# Patient Record
Sex: FEMALE | Race: WHITE | NOT HISPANIC OR LATINO | Employment: OTHER | URBAN - METROPOLITAN AREA
[De-identification: names, ages, dates, MRNs, and addresses within clinical notes are randomized per-mention and may not be internally consistent; named-entity substitution may affect disease eponyms.]

---

## 2023-06-01 DIAGNOSIS — D50.9 IRON DEFICIENCY ANEMIA, UNSPECIFIED IRON DEFICIENCY ANEMIA TYPE: Primary | ICD-10-CM

## 2023-06-01 NOTE — PROGRESS NOTES
.     REASON FOR CONSULTATION:   Iron deficiency anemia  Provide an opinion on any further workup or treatment                             REQUESTING PHYSICIAN: MARCUS Leung  RECORDS OBTAINED:  Records of the patient's history including those obtained from the referring provider were reviewed and summarized in detail.    HISTORY OF PRESENT ILLNESS:  The patient is a 73 y.o. year old female  who is here for follow-up with the above-mentioned history.    Reviewed PCP note, 5/18/2023 which was an annual wellness visit  5/18/2023: Hb 7.6.  Unable to take oral iron due to constipation.  Referred to us to consider IV iron.  Last colonoscopy and EGD by Dr. Petty, 2021.  WBC 2.7 on 5/18/2023, from 3.1 on 2/17/2023, from 3.3 on 11/28/2022.  Hb 7.6 on 5/18/2023, from 8.5 on 2/17/2023, from 8.6 on 11/28/2022.  MCV 78.4 on 5/18/2023, from 79.6 on 2/17/2023, from 86.4 on 11/28/2022.   on 5/18/2023.    11/28/2022: Ferritin 3, 12% saturation.    Denies bleeding.  Denies ice cravings.  Denies chest pain or SOA.  States she has been a little unsteady on her feet for the past year or so.  Does not think this has been worse over the past couple of months.  States she does not feel she is at risk for falling.  States energy level is good    Past Medical History:   Diagnosis Date   • Arthritis    • Cubital tunnel syndrome    • Diabetes mellitus    • H/O Skin lesion    • History of atrial fibrillation    • History of Brennan's esophagus    • History of iron deficiency anemia    • Hypercholesterolemia    • Hyperlipidemia      Past Surgical History:   Procedure Laterality Date   • HAND SURGERY Right    • TOTAL KNEE ARTHROPLASTY Left    • WISDOM TOOTH EXTRACTION         MEDICATIONS    Current Outpatient Medications:   •  AMLODIPINE BENZOATE PO, amLODIPine Benzoate, Disp: , Rfl:   •  apixaban (ELIQUIS) 5 MG tablet tablet, Take 1 tablet by mouth., Disp: , Rfl:   •  cyanocobalamin (VITAMIN B-12) 500 MCG tablet, Take 1  tablet by mouth Daily., Disp: , Rfl:   •  dapagliflozin (FARXIGA) 5 MG tablet tablet, Take 1 tablet by mouth Daily., Disp: 30 tablet, Rfl: 11  •  ferrous gluconate (FERGON) 324 MG tablet, Take 1 tablet by mouth Daily., Disp: , Rfl:   •  gabapentin (NEURONTIN) 300 MG capsule, , Disp: , Rfl:   •  metFORMIN ER (GLUCOPHAGE-XR) 500 MG 24 hr tablet, Take 1 tablet by mouth., Disp: , Rfl:   •  metoprolol succinate XL (TOPROL-XL) 50 MG 24 hr tablet, , Disp: , Rfl:   •  pravastatin (PRAVACHOL) 20 MG tablet, , Disp: , Rfl:   •  PRAVASTATIN SODIUM PO, Pravastatin Sodium, Disp: , Rfl:     ALLERGIES:     Allergies   Allergen Reactions   • Ace Inhibitors Unknown - Low Severity     And ARB   • Ampicillin Unknown - Low Severity   • Methylprednisolone Hives   • Penicillins Hives   • Rosuvastatin Unknown - Low Severity       SOCIAL HISTORY:       Social History     Socioeconomic History   • Marital status:    Tobacco Use   • Smoking status: Never   • Smokeless tobacco: Never   Substance and Sexual Activity   • Alcohol use: Never         FAMILY HISTORY:  Family History   Problem Relation Age of Onset   • Melanoma Mother    • Heart attack Father    • Cancer Brother    • Cancer Maternal Aunt        REVIEW OF SYSTEMS:  Review of Systems   Constitutional: Negative for activity change.   HENT: Negative for nosebleeds and trouble swallowing.    Respiratory: Negative for shortness of breath and wheezing.    Cardiovascular: Negative for chest pain and palpitations.   Gastrointestinal: Negative for constipation, diarrhea and nausea.   Genitourinary: Negative for dysuria and hematuria.   Musculoskeletal: Negative for arthralgias and myalgias.   Skin: Negative for rash and wound.   Neurological: Negative for seizures and syncope.   Hematological: Negative for adenopathy. Does not bruise/bleed easily.   Psychiatric/Behavioral: Negative for confusion.              Vitals:    06/02/23 1006   BP: 128/75   Pulse: 68   Resp: 16   Temp: 97.8 °F  "(36.6 °C)   TempSrc: Temporal   Weight: 70 kg (154 lb 6.4 oz)   Height: 154.9 cm (61\")   PainSc: 0-No pain         6/2/2023    10:08 AM   Current Status   ECOG score 0      PHYSICAL EXAM:          CONSTITUTIONAL:  Vital signs reviewed.  No distress, looks comfortable.  EYES:  Conjunctiva and lids unremarkable.  PERRLA  EARS,NOSE,MOUTH,THROAT:  Ears and nose appear unremarkable.  Lips, teeth, gums appear unremarkable.  RESPIRATORY:  Normal respiratory effort.  Lungs clear to auscultation bilaterally.  CARDIOVASCULAR:  Normal S1, S2.  No murmurs rubs or gallops.  No significant lower extremity edema.  GASTROINTESTINAL: Abdomen appears unremarkable.  Nontender.  No hepatomegaly.  No splenomegaly.  LYMPHATIC:  No cervical, supraclavicular, axillary lymphadenopathy.  SKIN:  Warm.  No rashes.  PSYCHIATRIC:  Normal judgment and insight.  Normal mood and affect.          RECENT LABS:        WBC   Date Value Ref Range Status   06/02/2023 3.61 3.40 - 10.80 10*3/mm3 Final     Hemoglobin   Date Value Ref Range Status   06/02/2023 6.7 (C) 12.0 - 15.9 g/dL Final     Platelets   Date Value Ref Range Status   06/02/2023 239 140 - 450 10*3/mm3 Final       Assessment & Plan   Iron deficiency anemia, unspecified iron deficiency anemia type  - Hold Transfusion and Notify Physician  - Nursing communication: Transfusion Reaction Management  - Verify Informed Consent for Transfusion  - Prepare RBC, 2 Units  - Transfuse RBC, 2 Units Infuse Each Unit Over: 2H  - sodium chloride 0.9 % infusion 250 mL  - Ambulatory Referral to [facility] For Infusion Treatment  - diphenhydrAMINE (BENADRYL) capsule 25 mg  - acetaminophen (TYLENOL) tablet 650 mg  - Type and screen  - Ferritin  - Iron Profile  - Retic With IRF & RET-He  - Vitamin B12  - Folate        Radha S Elliot   *Iron deficiency anemia  · 11/28/2022: Ferritin 3, 12% saturation.  · Cannot tolerate oral iron due to constipation  · Hb 6.7 on 6/2/2023 (initial consult), from 7.6 on 5/18/2023, from 8.5 " on 2/17/2023, from 8.6 on 11/28/2022.  · 6/2/2023: Transfuse 2 units and arrange 2 doses Injectafer (provided pending iron labs are low)    *Microcytosis  · MCV 82.3, from 78.4 on 5/18/2023, from 79.6 on 2/17/2023, from 86.4 on 11/28/2022.    *Source of iron deficiency  · Last colonoscopy and EGD by Dr. Petty, 2021.  · 6/2/2023 (initial consult): She declines referral for small bowel evaluation.  She understands she may have a GI malignancy that is bleeding causing iron deficiency but declines any further evaluation for this    *Leukocytopenia  · WBC 2.7 on 5/18/2023, from 3.1 on 2/17/2023, from 3.3 on 11/28/2022.    Plan  · Check iron labs.  If remains low, arrange IV iron  · Check B12 and RBC folate due to low WBC  · CBC on day of second Injectafer  · MD roughly 2 months.  At least 1 day prior: Ferritin, iron panel, CBC, reticulate hemoglobin

## 2023-06-02 ENCOUNTER — CONSULT (OUTPATIENT)
Dept: ONCOLOGY | Facility: CLINIC | Age: 73
End: 2023-06-02

## 2023-06-02 ENCOUNTER — LAB (OUTPATIENT)
Dept: OTHER | Facility: HOSPITAL | Age: 73
End: 2023-06-02
Payer: MEDICARE

## 2023-06-02 VITALS
TEMPERATURE: 97.8 F | HEART RATE: 68 BPM | HEIGHT: 61 IN | SYSTOLIC BLOOD PRESSURE: 128 MMHG | BODY MASS INDEX: 29.15 KG/M2 | WEIGHT: 154.4 LBS | DIASTOLIC BLOOD PRESSURE: 75 MMHG | RESPIRATION RATE: 16 BRPM

## 2023-06-02 DIAGNOSIS — D50.9 IRON DEFICIENCY ANEMIA, UNSPECIFIED IRON DEFICIENCY ANEMIA TYPE: Primary | ICD-10-CM

## 2023-06-02 DIAGNOSIS — D50.9 IRON DEFICIENCY ANEMIA, UNSPECIFIED IRON DEFICIENCY ANEMIA TYPE: ICD-10-CM

## 2023-06-02 LAB
ABO GROUP BLD: NORMAL
BASOPHILS # BLD AUTO: 0.02 10*3/MM3 (ref 0–0.2)
BASOPHILS NFR BLD AUTO: 0.6 % (ref 0–1.5)
BLD GP AB SCN SERPL QL: NEGATIVE
DEPRECATED RDW RBC AUTO: 53.1 FL (ref 37–54)
EOSINOPHIL # BLD AUTO: 0.02 10*3/MM3 (ref 0–0.4)
EOSINOPHIL NFR BLD AUTO: 0.6 % (ref 0.3–6.2)
ERYTHROCYTE [DISTWIDTH] IN BLOOD BY AUTOMATED COUNT: 17.9 % (ref 12.3–15.4)
FERRITIN SERPL-MCNC: 5.1 NG/ML (ref 13–150)
FOLATE SERPL-MCNC: >20 NG/ML (ref 4.78–24.2)
HCT VFR BLD AUTO: 23.7 % (ref 34–46.6)
HGB BLD-MCNC: 6.7 G/DL (ref 12–15.9)
HGB RETIC QN AUTO: 19.6 PG (ref 29.8–36.1)
IMM GRANULOCYTES # BLD AUTO: 0.06 10*3/MM3 (ref 0–0.05)
IMM GRANULOCYTES NFR BLD AUTO: 1.7 % (ref 0–0.5)
IMM RETICS NFR: 36.2 % (ref 3–15.8)
IRON 24H UR-MRATE: 32 MCG/DL (ref 37–145)
IRON SATN MFR SERPL: 7 % (ref 20–50)
LYMPHOCYTES # BLD AUTO: 1.63 10*3/MM3 (ref 0.7–3.1)
LYMPHOCYTES NFR BLD AUTO: 45.2 % (ref 19.6–45.3)
MCH RBC QN AUTO: 23.3 PG (ref 26.6–33)
MCHC RBC AUTO-ENTMCNC: 28.3 G/DL (ref 31.5–35.7)
MCV RBC AUTO: 82.3 FL (ref 79–97)
MONOCYTES # BLD AUTO: 0.35 10*3/MM3 (ref 0.1–0.9)
MONOCYTES NFR BLD AUTO: 9.7 % (ref 5–12)
NEUTROPHILS NFR BLD AUTO: 1.53 10*3/MM3 (ref 1.7–7)
NEUTROPHILS NFR BLD AUTO: 42.2 % (ref 42.7–76)
NRBC BLD AUTO-RTO: 0 /100 WBC (ref 0–0.2)
PLATELET # BLD AUTO: 239 10*3/MM3 (ref 140–450)
PMV BLD AUTO: 11.4 FL (ref 6–12)
RBC # BLD AUTO: 2.88 10*6/MM3 (ref 3.77–5.28)
RETICS # AUTO: 0.08 10*6/MM3 (ref 0.02–0.13)
RETICS/RBC NFR AUTO: 2.94 % (ref 0.7–1.9)
RH BLD: POSITIVE
T&S EXPIRATION DATE: NORMAL
TIBC SERPL-MCNC: 448 MCG/DL (ref 298–536)
TRANSFERRIN SERPL-MCNC: 301 MG/DL (ref 200–360)
VIT B12 BLD-MCNC: 933 PG/ML (ref 211–946)
WBC NRBC COR # BLD: 3.61 10*3/MM3 (ref 3.4–10.8)

## 2023-06-02 PROCEDURE — 86920 COMPATIBILITY TEST SPIN: CPT

## 2023-06-02 PROCEDURE — 82607 VITAMIN B-12: CPT | Performed by: INTERNAL MEDICINE

## 2023-06-02 PROCEDURE — 83540 ASSAY OF IRON: CPT | Performed by: INTERNAL MEDICINE

## 2023-06-02 PROCEDURE — 86900 BLOOD TYPING SEROLOGIC ABO: CPT | Performed by: INTERNAL MEDICINE

## 2023-06-02 PROCEDURE — 86901 BLOOD TYPING SEROLOGIC RH(D): CPT

## 2023-06-02 PROCEDURE — 82746 ASSAY OF FOLIC ACID SERUM: CPT | Performed by: INTERNAL MEDICINE

## 2023-06-02 PROCEDURE — 86850 RBC ANTIBODY SCREEN: CPT | Performed by: INTERNAL MEDICINE

## 2023-06-02 PROCEDURE — 36415 COLL VENOUS BLD VENIPUNCTURE: CPT

## 2023-06-02 PROCEDURE — 82728 ASSAY OF FERRITIN: CPT | Performed by: INTERNAL MEDICINE

## 2023-06-02 PROCEDURE — 85025 COMPLETE CBC W/AUTO DIFF WBC: CPT | Performed by: INTERNAL MEDICINE

## 2023-06-02 PROCEDURE — 86900 BLOOD TYPING SEROLOGIC ABO: CPT

## 2023-06-02 PROCEDURE — 1126F AMNT PAIN NOTED NONE PRSNT: CPT | Performed by: INTERNAL MEDICINE

## 2023-06-02 PROCEDURE — 86901 BLOOD TYPING SEROLOGIC RH(D): CPT | Performed by: INTERNAL MEDICINE

## 2023-06-02 PROCEDURE — 85046 RETICYTE/HGB CONCENTRATE: CPT | Performed by: INTERNAL MEDICINE

## 2023-06-02 PROCEDURE — 99204 OFFICE O/P NEW MOD 45 MIN: CPT | Performed by: INTERNAL MEDICINE

## 2023-06-02 PROCEDURE — 84466 ASSAY OF TRANSFERRIN: CPT | Performed by: INTERNAL MEDICINE

## 2023-06-02 RX ORDER — SODIUM CHLORIDE 9 MG/ML
250 INJECTION, SOLUTION INTRAVENOUS AS NEEDED
Status: CANCELLED | OUTPATIENT
Start: 2023-06-02

## 2023-06-02 RX ORDER — METOPROLOL SUCCINATE 50 MG/1
TABLET, EXTENDED RELEASE ORAL
COMMUNITY
Start: 2023-02-17

## 2023-06-02 RX ORDER — DOXYCYCLINE HYCLATE 50 MG/1
324 CAPSULE, GELATIN COATED ORAL DAILY
COMMUNITY
Start: 2023-05-29

## 2023-06-02 RX ORDER — ACETAMINOPHEN 325 MG/1
650 TABLET ORAL ONCE
Status: CANCELLED | OUTPATIENT
Start: 2023-06-02 | End: 2023-06-02

## 2023-06-02 RX ORDER — PRAVASTATIN SODIUM 20 MG
TABLET ORAL
COMMUNITY
Start: 2023-05-20

## 2023-06-02 RX ORDER — DIPHENHYDRAMINE HCL 25 MG
25 CAPSULE ORAL ONCE
Status: CANCELLED | OUTPATIENT
Start: 2023-06-02 | End: 2023-06-02

## 2023-06-02 RX ORDER — GABAPENTIN 300 MG/1
CAPSULE ORAL
COMMUNITY
Start: 2023-05-25

## 2023-06-02 RX ORDER — METFORMIN HYDROCHLORIDE 500 MG/1
500 TABLET, EXTENDED RELEASE ORAL
COMMUNITY
Start: 2023-03-14

## 2023-06-04 ENCOUNTER — INFUSION (OUTPATIENT)
Dept: ONCOLOGY | Facility: HOSPITAL | Age: 73
End: 2023-06-04

## 2023-06-04 VITALS
RESPIRATION RATE: 18 BRPM | SYSTOLIC BLOOD PRESSURE: 149 MMHG | HEART RATE: 89 BPM | DIASTOLIC BLOOD PRESSURE: 82 MMHG | TEMPERATURE: 98.1 F

## 2023-06-04 DIAGNOSIS — D50.9 IRON DEFICIENCY ANEMIA, UNSPECIFIED IRON DEFICIENCY ANEMIA TYPE: ICD-10-CM

## 2023-06-04 PROCEDURE — 63710000001 DIPHENHYDRAMINE PER 50 MG: Performed by: INTERNAL MEDICINE

## 2023-06-04 RX ORDER — DIPHENHYDRAMINE HCL 25 MG
25 CAPSULE ORAL ONCE
Status: COMPLETED | OUTPATIENT
Start: 2023-06-04 | End: 2023-06-04

## 2023-06-04 RX ORDER — SODIUM CHLORIDE 9 MG/ML
250 INJECTION, SOLUTION INTRAVENOUS AS NEEDED
Status: DISCONTINUED | OUTPATIENT
Start: 2023-06-04 | End: 2023-06-04 | Stop reason: HOSPADM

## 2023-06-04 RX ORDER — ACETAMINOPHEN 325 MG/1
650 TABLET ORAL ONCE
Status: COMPLETED | OUTPATIENT
Start: 2023-06-04 | End: 2023-06-04

## 2023-06-04 RX ADMIN — DIPHENHYDRAMINE HYDROCHLORIDE 25 MG: 25 CAPSULE ORAL at 08:34

## 2023-06-04 RX ADMIN — ACETAMINOPHEN 650 MG: 325 TABLET ORAL at 08:34

## 2023-06-05 LAB
BH BB BLOOD EXPIRATION DATE: NORMAL
BH BB BLOOD EXPIRATION DATE: NORMAL
BH BB BLOOD TYPE BARCODE: 5100
BH BB BLOOD TYPE BARCODE: 5100
BH BB DISPENSE STATUS: NORMAL
BH BB DISPENSE STATUS: NORMAL
BH BB PRODUCT CODE: NORMAL
BH BB PRODUCT CODE: NORMAL
BH BB UNIT NUMBER: NORMAL
BH BB UNIT NUMBER: NORMAL
CROSSMATCH INTERPRETATION: NORMAL
CROSSMATCH INTERPRETATION: NORMAL
UNIT  ABO: NORMAL
UNIT  ABO: NORMAL
UNIT  RH: NORMAL
UNIT  RH: NORMAL

## 2023-06-09 ENCOUNTER — INFUSION (OUTPATIENT)
Dept: ONCOLOGY | Facility: HOSPITAL | Age: 73
End: 2023-06-09
Payer: MEDICARE

## 2023-06-09 VITALS
BODY MASS INDEX: 28.83 KG/M2 | DIASTOLIC BLOOD PRESSURE: 91 MMHG | RESPIRATION RATE: 16 BRPM | SYSTOLIC BLOOD PRESSURE: 179 MMHG | WEIGHT: 152.6 LBS | OXYGEN SATURATION: 95 % | TEMPERATURE: 97.1 F | HEART RATE: 89 BPM

## 2023-06-09 DIAGNOSIS — D50.9 IRON DEFICIENCY ANEMIA, UNSPECIFIED IRON DEFICIENCY ANEMIA TYPE: Primary | ICD-10-CM

## 2023-06-09 PROCEDURE — A9270 NON-COVERED ITEM OR SERVICE: HCPCS | Performed by: INTERNAL MEDICINE

## 2023-06-09 PROCEDURE — 63710000001 PROCHLORPERAZINE MALEATE PER 10 MG: Performed by: INTERNAL MEDICINE

## 2023-06-09 PROCEDURE — 25010000002 FERRIC CARBOXYMALTOSE 750 MG/15ML SOLUTION 15 ML VIAL: Performed by: INTERNAL MEDICINE

## 2023-06-09 PROCEDURE — 96374 THER/PROPH/DIAG INJ IV PUSH: CPT

## 2023-06-09 RX ORDER — SODIUM CHLORIDE 9 MG/ML
250 INJECTION, SOLUTION INTRAVENOUS ONCE
Status: COMPLETED | OUTPATIENT
Start: 2023-06-09 | End: 2023-06-09

## 2023-06-09 RX ORDER — PROCHLORPERAZINE MALEATE 10 MG
10 TABLET ORAL ONCE
Status: COMPLETED | OUTPATIENT
Start: 2023-06-09 | End: 2023-06-09

## 2023-06-09 RX ADMIN — FERRIC CARBOXYMALTOSE INJECTION 750 MG: 50 INJECTION, SOLUTION INTRAVENOUS at 08:22

## 2023-06-09 RX ADMIN — PROCHLORPERAZINE MALEATE 10 MG: 10 TABLET ORAL at 08:14

## 2023-06-09 RX ADMIN — SODIUM CHLORIDE 250 ML: 9 INJECTION, SOLUTION INTRAVENOUS at 08:21

## 2023-06-16 ENCOUNTER — LAB (OUTPATIENT)
Dept: LAB | Facility: HOSPITAL | Age: 73
End: 2023-06-16
Payer: MEDICARE

## 2023-06-16 ENCOUNTER — INFUSION (OUTPATIENT)
Dept: ONCOLOGY | Facility: HOSPITAL | Age: 73
End: 2023-06-16
Payer: MEDICARE

## 2023-06-16 VITALS
TEMPERATURE: 97.7 F | WEIGHT: 149.8 LBS | RESPIRATION RATE: 16 BRPM | BODY MASS INDEX: 28.3 KG/M2 | SYSTOLIC BLOOD PRESSURE: 144 MMHG | OXYGEN SATURATION: 97 % | HEART RATE: 85 BPM | DIASTOLIC BLOOD PRESSURE: 81 MMHG

## 2023-06-16 DIAGNOSIS — D50.9 IRON DEFICIENCY ANEMIA, UNSPECIFIED IRON DEFICIENCY ANEMIA TYPE: Primary | ICD-10-CM

## 2023-06-16 DIAGNOSIS — D50.9 IRON DEFICIENCY ANEMIA, UNSPECIFIED IRON DEFICIENCY ANEMIA TYPE: ICD-10-CM

## 2023-06-16 LAB
BASOPHILS # BLD AUTO: 0.04 10*3/MM3 (ref 0–0.2)
BASOPHILS NFR BLD AUTO: 0.6 % (ref 0–1.5)
DEPRECATED RDW RBC AUTO: 65.8 FL (ref 37–54)
EOSINOPHIL # BLD AUTO: 0.06 10*3/MM3 (ref 0–0.4)
EOSINOPHIL NFR BLD AUTO: 1 % (ref 0.3–6.2)
ERYTHROCYTE [DISTWIDTH] IN BLOOD BY AUTOMATED COUNT: 20.5 % (ref 12.3–15.4)
HCT VFR BLD AUTO: 39.8 % (ref 34–46.6)
HGB BLD-MCNC: 11.8 G/DL (ref 12–15.9)
IMM GRANULOCYTES # BLD AUTO: 0.26 10*3/MM3 (ref 0–0.05)
IMM GRANULOCYTES NFR BLD AUTO: 4.1 % (ref 0–0.5)
LYMPHOCYTES # BLD AUTO: 2.55 10*3/MM3 (ref 0.7–3.1)
LYMPHOCYTES NFR BLD AUTO: 40.7 % (ref 19.6–45.3)
MCH RBC QN AUTO: 26.6 PG (ref 26.6–33)
MCHC RBC AUTO-ENTMCNC: 29.6 G/DL (ref 31.5–35.7)
MCV RBC AUTO: 89.6 FL (ref 79–97)
MONOCYTES # BLD AUTO: 0.4 10*3/MM3 (ref 0.1–0.9)
MONOCYTES NFR BLD AUTO: 6.4 % (ref 5–12)
NEUTROPHILS NFR BLD AUTO: 2.96 10*3/MM3 (ref 1.7–7)
NEUTROPHILS NFR BLD AUTO: 47.2 % (ref 42.7–76)
NRBC BLD AUTO-RTO: 0 /100 WBC (ref 0–0.2)
PLATELET # BLD AUTO: 152 10*3/MM3 (ref 140–450)
PMV BLD AUTO: 12.5 FL (ref 6–12)
RBC # BLD AUTO: 4.44 10*6/MM3 (ref 3.77–5.28)
WBC NRBC COR # BLD: 6.27 10*3/MM3 (ref 3.4–10.8)

## 2023-06-16 PROCEDURE — 25010000002 FERRIC CARBOXYMALTOSE 750 MG/15ML SOLUTION 15 ML VIAL: Performed by: INTERNAL MEDICINE

## 2023-06-16 PROCEDURE — 36415 COLL VENOUS BLD VENIPUNCTURE: CPT

## 2023-06-16 PROCEDURE — 85025 COMPLETE CBC W/AUTO DIFF WBC: CPT

## 2023-06-16 PROCEDURE — A9270 NON-COVERED ITEM OR SERVICE: HCPCS | Performed by: INTERNAL MEDICINE

## 2023-06-16 PROCEDURE — 96374 THER/PROPH/DIAG INJ IV PUSH: CPT

## 2023-06-16 PROCEDURE — 63710000001 PROCHLORPERAZINE MALEATE PER 10 MG: Performed by: INTERNAL MEDICINE

## 2023-06-16 RX ORDER — PROCHLORPERAZINE MALEATE 10 MG
10 TABLET ORAL ONCE
Status: COMPLETED | OUTPATIENT
Start: 2023-06-16 | End: 2023-06-16

## 2023-06-16 RX ORDER — SODIUM CHLORIDE 9 MG/ML
250 INJECTION, SOLUTION INTRAVENOUS ONCE
Status: COMPLETED | OUTPATIENT
Start: 2023-06-16 | End: 2023-06-16

## 2023-06-16 RX ADMIN — PROCHLORPERAZINE MALEATE 10 MG: 10 TABLET ORAL at 08:16

## 2023-06-16 RX ADMIN — SODIUM CHLORIDE 250 ML: 9 INJECTION, SOLUTION INTRAVENOUS at 08:21

## 2023-06-16 RX ADMIN — FERRIC CARBOXYMALTOSE INJECTION 750 MG: 50 INJECTION, SOLUTION INTRAVENOUS at 08:20

## 2023-08-14 ENCOUNTER — LAB (OUTPATIENT)
Dept: LAB | Facility: HOSPITAL | Age: 73
End: 2023-08-14
Payer: MEDICARE

## 2023-08-14 DIAGNOSIS — D50.9 IRON DEFICIENCY ANEMIA, UNSPECIFIED IRON DEFICIENCY ANEMIA TYPE: ICD-10-CM

## 2023-08-14 LAB
BASOPHILS # BLD AUTO: 0.02 10*3/MM3 (ref 0–0.2)
BASOPHILS NFR BLD AUTO: 0.6 % (ref 0–1.5)
DEPRECATED RDW RBC AUTO: 53.7 FL (ref 37–54)
EOSINOPHIL # BLD AUTO: 0.03 10*3/MM3 (ref 0–0.4)
EOSINOPHIL NFR BLD AUTO: 0.8 % (ref 0.3–6.2)
ERYTHROCYTE [DISTWIDTH] IN BLOOD BY AUTOMATED COUNT: 15 % (ref 12.3–15.4)
FERRITIN SERPL-MCNC: 165 NG/ML (ref 13–150)
HCT VFR BLD AUTO: 40.6 % (ref 34–46.6)
HGB BLD-MCNC: 12.5 G/DL (ref 12–15.9)
HGB RETIC QN AUTO: 33.4 PG (ref 29.8–36.1)
IMM GRANULOCYTES # BLD AUTO: 0.05 10*3/MM3 (ref 0–0.05)
IMM GRANULOCYTES NFR BLD AUTO: 1.4 % (ref 0–0.5)
IMM RETICS NFR: 19.9 % (ref 3–15.8)
IRON 24H UR-MRATE: 72 MCG/DL (ref 37–145)
IRON SATN MFR SERPL: 29 % (ref 20–50)
LYMPHOCYTES # BLD AUTO: 1.79 10*3/MM3 (ref 0.7–3.1)
LYMPHOCYTES NFR BLD AUTO: 50 % (ref 19.6–45.3)
MCH RBC QN AUTO: 30 PG (ref 26.6–33)
MCHC RBC AUTO-ENTMCNC: 30.8 G/DL (ref 31.5–35.7)
MCV RBC AUTO: 97.4 FL (ref 79–97)
MONOCYTES # BLD AUTO: 0.17 10*3/MM3 (ref 0.1–0.9)
MONOCYTES NFR BLD AUTO: 4.7 % (ref 5–12)
NEUTROPHILS NFR BLD AUTO: 1.52 10*3/MM3 (ref 1.7–7)
NEUTROPHILS NFR BLD AUTO: 42.5 % (ref 42.7–76)
NRBC BLD AUTO-RTO: 0 /100 WBC (ref 0–0.2)
PLATELET # BLD AUTO: 175 10*3/MM3 (ref 140–450)
PMV BLD AUTO: 9.5 FL (ref 6–12)
RBC # BLD AUTO: 4.17 10*6/MM3 (ref 3.77–5.28)
RETICS # AUTO: 0.1 10*6/MM3 (ref 0.02–0.13)
RETICS/RBC NFR AUTO: 2.45 % (ref 0.7–1.9)
TIBC SERPL-MCNC: 246 MCG/DL (ref 298–536)
TRANSFERRIN SERPL-MCNC: 176 MG/DL (ref 200–360)
WBC NRBC COR # BLD: 3.58 10*3/MM3 (ref 3.4–10.8)

## 2023-08-14 PROCEDURE — 82728 ASSAY OF FERRITIN: CPT

## 2023-08-14 PROCEDURE — 85025 COMPLETE CBC W/AUTO DIFF WBC: CPT

## 2023-08-14 PROCEDURE — 85046 RETICYTE/HGB CONCENTRATE: CPT

## 2023-08-14 PROCEDURE — 84466 ASSAY OF TRANSFERRIN: CPT

## 2023-08-14 PROCEDURE — 83540 ASSAY OF IRON: CPT

## 2023-08-14 PROCEDURE — 36415 COLL VENOUS BLD VENIPUNCTURE: CPT

## 2023-08-16 ENCOUNTER — OFFICE VISIT (OUTPATIENT)
Dept: ONCOLOGY | Facility: CLINIC | Age: 73
End: 2023-08-16
Payer: MEDICARE

## 2023-08-16 VITALS
HEART RATE: 76 BPM | TEMPERATURE: 98.7 F | RESPIRATION RATE: 16 BRPM | OXYGEN SATURATION: 100 % | WEIGHT: 155.1 LBS | SYSTOLIC BLOOD PRESSURE: 156 MMHG | HEIGHT: 62 IN | BODY MASS INDEX: 28.54 KG/M2 | DIASTOLIC BLOOD PRESSURE: 78 MMHG

## 2023-08-16 DIAGNOSIS — D50.9 IRON DEFICIENCY ANEMIA, UNSPECIFIED IRON DEFICIENCY ANEMIA TYPE: Primary | ICD-10-CM

## 2023-08-16 PROCEDURE — 99214 OFFICE O/P EST MOD 30 MIN: CPT | Performed by: INTERNAL MEDICINE

## 2023-08-16 PROCEDURE — 1126F AMNT PAIN NOTED NONE PRSNT: CPT | Performed by: INTERNAL MEDICINE

## 2023-08-16 NOTE — PROGRESS NOTES
.     REASON FOR FOLLOWUP :   Iron deficiency anemia  HISTORY OF PRESENT ILLNESS:  The patient is a 73 y.o. year old female  who is here for follow-up with the above-mentioned history.    Feels much better since her labs have normalized.  Denies bleeding.  Denies chest pain or SOA    Past Medical History:   Diagnosis Date    Arthritis     Cubital tunnel syndrome     Diabetes mellitus     H/O Skin lesion     History of atrial fibrillation     History of Brennan's esophagus     History of iron deficiency anemia     Hypercholesterolemia     Hyperlipidemia      Past Surgical History:   Procedure Laterality Date    HAND SURGERY Right     TOTAL KNEE ARTHROPLASTY Left     WISDOM TOOTH EXTRACTION         MEDICATIONS    Current Outpatient Medications:     AMLODIPINE BENZOATE PO, amLODIPine Benzoate, Disp: , Rfl:     apixaban (ELIQUIS) 5 MG tablet tablet, Take 1 tablet by mouth., Disp: , Rfl:     cyanocobalamin (VITAMIN B-12) 500 MCG tablet, Take 1 tablet by mouth Daily., Disp: , Rfl:     dapagliflozin (FARXIGA) 5 MG tablet tablet, Take 1 tablet by mouth Daily., Disp: 30 tablet, Rfl: 11    ferrous gluconate (FERGON) 324 MG tablet, Take 1 tablet by mouth Daily., Disp: , Rfl:     gabapentin (NEURONTIN) 300 MG capsule, , Disp: , Rfl:     metFORMIN ER (GLUCOPHAGE-XR) 500 MG 24 hr tablet, Take 1 tablet by mouth., Disp: , Rfl:     metoprolol succinate XL (TOPROL-XL) 50 MG 24 hr tablet, , Disp: , Rfl:     pravastatin (PRAVACHOL) 20 MG tablet, , Disp: , Rfl:     PRAVASTATIN SODIUM PO, Pravastatin Sodium, Disp: , Rfl:     ALLERGIES:     Allergies   Allergen Reactions    Ace Inhibitors Unknown - Low Severity     And ARB    Ampicillin Unknown - Low Severity    Methylprednisolone Hives    Penicillins Hives    Rosuvastatin Unknown - Low Severity       SOCIAL HISTORY:       Social History     Socioeconomic History    Marital status:    Tobacco Use    Smoking status: Never    Smokeless tobacco: Never   Substance and Sexual  "Activity    Alcohol use: Never         FAMILY HISTORY:  Family History   Problem Relation Age of Onset    Melanoma Mother     Heart attack Father     Cancer Brother     Cancer Maternal Aunt        REVIEW OF SYSTEMS:  Review of Systems   Constitutional:  Negative for activity change.   HENT:  Negative for nosebleeds and trouble swallowing.    Respiratory:  Negative for shortness of breath and wheezing.    Cardiovascular:  Negative for chest pain and palpitations.   Gastrointestinal:  Negative for constipation, diarrhea and nausea.   Genitourinary:  Negative for dysuria and hematuria.   Musculoskeletal:  Negative for arthralgias and myalgias.   Skin:  Negative for rash and wound.   Neurological:  Negative for seizures and syncope.   Hematological:  Negative for adenopathy. Does not bruise/bleed easily.   Psychiatric/Behavioral:  Negative for confusion.             Vitals:    08/16/23 0834   BP: 156/78   Pulse: 76   Resp: 16   Temp: 98.7 øF (37.1 øC)   TempSrc: Temporal   SpO2: 100%   Weight: 70.4 kg (155 lb 1.6 oz)   Height: 157.5 cm (62\")   PainSc: 0-No pain         8/16/2023     8:38 AM   Current Status   ECOG score 0      PHYSICAL EXAM:          CONSTITUTIONAL:  Vital signs reviewed.  No distress, looks comfortable.  EYES:  Conjunctiva and lids unremarkable.  PERRLA  EARS,NOSE,MOUTH,THROAT:  Ears and nose appear unremarkable.  Lips, teeth, gums appear unremarkable.  RESPIRATORY:  Normal respiratory effort.  Lungs clear to auscultation bilaterally.  CARDIOVASCULAR:  Normal S1, S2.  No murmurs rubs or gallops.  No significant lower extremity edema.  GASTROINTESTINAL: Abdomen appears unremarkable.  Nontender.  No hepatomegaly.  No splenomegaly.  LYMPHATIC:  No cervical, supraclavicular, axillary lymphadenopathy.  SKIN:  Warm.  No rashes.  PSYCHIATRIC:  Normal judgment and insight.  Normal mood and affect.            RECENT LABS:        WBC   Date Value Ref Range Status   08/14/2023 3.58 3.40 - 10.80 10*3/mm3 Final "   06/16/2023 6.27 3.40 - 10.80 10*3/mm3 Final   06/02/2023 3.61 3.40 - 10.80 10*3/mm3 Final   04/21/2021 5.65 4.5 - 11.0 10*3/uL Final   11/09/2020 4.11 (L) 4.5 - 11.0 10*3/uL Final   05/29/2020 3.46 (L) 4.5 - 11.0 10*3/uL Final   04/09/2020 3.83 (L) 4.5 - 11.0 10*3/uL Final   03/11/2020 4.54 4.5 - 11.0 10*3/uL Final   03/10/2020 4.72 4.5 - 11.0 10*3/uL Final   03/06/2020 5.08 4.5 - 11.0 10*3/uL Final   03/03/2020 3.83 (L) 4.5 - 11.0 10*3/uL Final   10/18/2018 4.46 (L) 4.5 - 11.0 10*3/uL Final   05/01/2018 5.27 4.5 - 11.0 10*3/uL Final     Hemoglobin   Date Value Ref Range Status   08/14/2023 12.5 12.0 - 15.9 g/dL Final   06/16/2023 11.8 (L) 12.0 - 15.9 g/dL Final   06/02/2023 6.7 (C) 12.0 - 15.9 g/dL Final   04/21/2021 9.7 (L) 12.0 - 16.0 g/dL Final   11/09/2020 9.6 (L) 12.0 - 16.0 g/dL Final   05/29/2020 11.0 (L) 12.0 - 16.0 g/dL Final   04/09/2020 9.5 (L) 12.0 - 16.0 g/dL Final   03/11/2020 8.7 (L) 12.0 - 16.0 g/dL Final   03/10/2020 10.2 (L) 12.0 - 16.0 g/dL Final   03/06/2020 9.2 (L) 12.0 - 16.0 g/dL Final   03/03/2020 9.6 (L) 12.0 - 16.0 g/dL Final   10/18/2018 12.0 12.0 - 16.0 g/dL Final   05/01/2018 13.3 12.0 - 16.0 g/dL Final     Platelets   Date Value Ref Range Status   08/14/2023 175 140 - 450 10*3/mm3 Final   06/16/2023 152 140 - 450 10*3/mm3 Final   06/02/2023 239 140 - 450 10*3/mm3 Final   04/21/2021 276 140 - 440 10*3/uL Final   11/09/2020 287 140 - 440 10*3/uL Final   05/29/2020 298 140 - 440 10*3/uL Final   04/09/2020 319 140 - 440 10*3/uL Final   03/11/2020 287 140 - 440 10*3/uL Final   03/10/2020 309 140 - 440 10*3/uL Final   03/06/2020 313 140 - 440 10*3/uL Final   03/03/2020 287 140 - 440 10*3/uL Final   10/18/2018 270 140 - 440 10*3/uL Final   05/01/2018 255 140 - 440 10*3/uL Final       Assessment & Plan   There are no diagnoses linked to this encounter.        Radha Zarate   *Iron deficiency anemia  11/28/2022: Ferritin 3, 12% saturation.  Cannot tolerate oral iron due to constipation  Hb  6.7 on 6/2/2023 (initial consult), from 7.6 on 5/18/2023, from 8.5 on 2/17/2023, from 8.6 on 11/28/2022.  6/2/2023: Transfuse 2 units and arrange 2 doses Injectafer (provided pending iron labs are low)  8/14/2023: Ferritin 165, 29% saturation, Hb 12.5.  No need for Injectafer    *Macrocytosis  Was microcytic prior to iron replacement  Unremarkable: B12, serum folate  MCV 97.4    *Source of iron deficiency  Last colonoscopy and EGD by Dr. Petty, 2021.  6/2/2023 (initial consult): She declines referral for small bowel evaluation.  She understands she may have a GI malignancy that is bleeding causing iron deficiency but declines any further evaluation for this    *Leukocytopenia  WBC 3.6, from 2.7 on 5/18/2023, from 3.1 on 2/17/2023, from 3.3 on 11/28/2022.    Plan  MD roughly 3 months.  At least 1 day prior: Ferritin, iron panel, CBC, reticulate hemoglobin  No need for Injectafer now

## 2023-10-30 ENCOUNTER — TELEPHONE (OUTPATIENT)
Dept: ONCOLOGY | Facility: CLINIC | Age: 73
End: 2023-10-30
Payer: MEDICARE

## 2023-10-30 NOTE — TELEPHONE ENCOUNTER
Caller: Radha Zarate    Relationship: Self    Best call back number: [phone]    What orders are you requesting (i.e. lab or imaging): LABS    In what timeframe would the patient need to come in: SHE HAS A FOLLOW UP WITH PCP ON 11/01    Where will you receive your lab/imaging services: HER PCP - MARCUS VÁSQUEZ    Additional notes: SHE WOULD LIKE TO CANCEL HER LAB APPOINTMENT FOR 11/07.

## 2023-10-30 NOTE — TELEPHONE ENCOUNTER
Called the patient to let her know I faxed the lab orders to her PCP at . Patient did not answer but a v/m was left.

## 2023-11-08 ENCOUNTER — OFFICE VISIT (OUTPATIENT)
Dept: ONCOLOGY | Facility: CLINIC | Age: 73
End: 2023-11-08
Payer: MEDICARE

## 2023-11-08 VITALS
OXYGEN SATURATION: 100 % | TEMPERATURE: 98 F | HEART RATE: 82 BPM | WEIGHT: 157 LBS | BODY MASS INDEX: 28.89 KG/M2 | DIASTOLIC BLOOD PRESSURE: 84 MMHG | RESPIRATION RATE: 16 BRPM | HEIGHT: 62 IN | SYSTOLIC BLOOD PRESSURE: 141 MMHG

## 2023-11-08 DIAGNOSIS — D50.9 IRON DEFICIENCY ANEMIA, UNSPECIFIED IRON DEFICIENCY ANEMIA TYPE: Primary | ICD-10-CM

## 2023-11-08 PROCEDURE — 1126F AMNT PAIN NOTED NONE PRSNT: CPT | Performed by: INTERNAL MEDICINE

## 2023-11-08 PROCEDURE — 99214 OFFICE O/P EST MOD 30 MIN: CPT | Performed by: INTERNAL MEDICINE

## 2023-11-08 NOTE — PROGRESS NOTES
.     REASON FOR FOLLOWUP :   Iron deficiency anemia  HISTORY OF PRESENT ILLNESS:  The patient is a 73 y.o. year old female  who is here for follow-up with the above-mentioned history.    No new problems.  Feels fine.  No bleeding.    Past Medical History:   Diagnosis Date    Arthritis     Cubital tunnel syndrome     Diabetes mellitus     H/O Skin lesion     History of atrial fibrillation     History of Brennan's esophagus     History of iron deficiency anemia     Hypercholesterolemia     Hyperlipidemia      Past Surgical History:   Procedure Laterality Date    HAND SURGERY Right     TOTAL KNEE ARTHROPLASTY Left     WISDOM TOOTH EXTRACTION         MEDICATIONS    Current Outpatient Medications:     AMLODIPINE BENZOATE PO, amLODIPine Benzoate, Disp: , Rfl:     apixaban (ELIQUIS) 5 MG tablet tablet, Take 1 tablet by mouth., Disp: , Rfl:     cyanocobalamin (VITAMIN B-12) 500 MCG tablet, Take 1 tablet by mouth Daily., Disp: , Rfl:     dapagliflozin (FARXIGA) 5 MG tablet tablet, Take 1 tablet by mouth Daily., Disp: 30 tablet, Rfl: 11    ferrous gluconate (FERGON) 324 MG tablet, Take 1 tablet by mouth Daily., Disp: , Rfl:     gabapentin (NEURONTIN) 300 MG capsule, , Disp: , Rfl:     metFORMIN ER (GLUCOPHAGE-XR) 500 MG 24 hr tablet, Take 1 tablet by mouth., Disp: , Rfl:     metoprolol succinate XL (TOPROL-XL) 50 MG 24 hr tablet, , Disp: , Rfl:     pravastatin (PRAVACHOL) 20 MG tablet, , Disp: , Rfl:     PRAVASTATIN SODIUM PO, Pravastatin Sodium, Disp: , Rfl:     ALLERGIES:     Allergies   Allergen Reactions    Ace Inhibitors Unknown - Low Severity     And ARB    Ampicillin Unknown - Low Severity    Methylprednisolone Hives    Penicillins Hives    Rosuvastatin Unknown - Low Severity       SOCIAL HISTORY:       Social History     Socioeconomic History    Marital status:    Tobacco Use    Smoking status: Never    Smokeless tobacco: Never   Substance and Sexual Activity    Alcohol use: Never         FAMILY  "HISTORY:  Family History   Problem Relation Age of Onset    Melanoma Mother     Heart attack Father     Cancer Brother     Cancer Maternal Aunt        REVIEW OF SYSTEMS:  Review of Systems   Constitutional:  Negative for activity change.   HENT:  Negative for nosebleeds and trouble swallowing.    Respiratory:  Negative for shortness of breath and wheezing.    Cardiovascular:  Negative for chest pain and palpitations.   Gastrointestinal:  Negative for constipation, diarrhea and nausea.   Genitourinary:  Negative for dysuria and hematuria.   Musculoskeletal:  Negative for arthralgias and myalgias.   Skin:  Negative for rash and wound.   Neurological:  Negative for seizures and syncope.   Hematological:  Negative for adenopathy. Does not bruise/bleed easily.   Psychiatric/Behavioral:  Negative for confusion.               Vitals:    11/08/23 0918   BP: 141/84   Pulse: 82   Resp: 16   Temp: 98 °F (36.7 °C)   TempSrc: Temporal   SpO2: 100%   Weight: 71.2 kg (157 lb)   Height: 157 cm (61.81\")   PainSc: 0-No pain         11/8/2023     9:22 AM   Current Status   ECOG score 0      PHYSICAL EXAM:          CONSTITUTIONAL:  Vital signs reviewed.  No distress, looks comfortable.  EYES:  Conjunctiva and lids unremarkable.  PERRLA  EARS,NOSE,MOUTH,THROAT:  Ears and nose appear unremarkable.  Lips, teeth, gums appear unremarkable.  RESPIRATORY:  Normal respiratory effort.  Lungs clear to auscultation bilaterally.  CARDIOVASCULAR:  Normal S1, S2.  No murmurs rubs or gallops.  No significant lower extremity edema.  GASTROINTESTINAL: Abdomen appears unremarkable.  Nontender.  No hepatomegaly.  No splenomegaly.  LYMPHATIC:  No cervical, supraclavicular, axillary lymphadenopathy.  SKIN:  Warm.  No rashes.  PSYCHIATRIC:  Normal judgment and insight.  Normal mood and affect.         RECENT LABS:        WBC   Date Value Ref Range Status   08/14/2023 3.58 3.40 - 10.80 10*3/mm3 Final   06/16/2023 6.27 3.40 - 10.80 10*3/mm3 Final   06/02/2023 " 3.61 3.40 - 10.80 10*3/mm3 Final   04/21/2021 5.65 4.5 - 11.0 10*3/uL Final   11/09/2020 4.11 (L) 4.5 - 11.0 10*3/uL Final   05/29/2020 3.46 (L) 4.5 - 11.0 10*3/uL Final   04/09/2020 3.83 (L) 4.5 - 11.0 10*3/uL Final   03/11/2020 4.54 4.5 - 11.0 10*3/uL Final   03/10/2020 4.72 4.5 - 11.0 10*3/uL Final   03/06/2020 5.08 4.5 - 11.0 10*3/uL Final   03/03/2020 3.83 (L) 4.5 - 11.0 10*3/uL Final   10/18/2018 4.46 (L) 4.5 - 11.0 10*3/uL Final   05/01/2018 5.27 4.5 - 11.0 10*3/uL Final     Hemoglobin   Date Value Ref Range Status   08/14/2023 12.5 12.0 - 15.9 g/dL Final   06/16/2023 11.8 (L) 12.0 - 15.9 g/dL Final   06/02/2023 6.7 (C) 12.0 - 15.9 g/dL Final   04/21/2021 9.7 (L) 12.0 - 16.0 g/dL Final   11/09/2020 9.6 (L) 12.0 - 16.0 g/dL Final   05/29/2020 11.0 (L) 12.0 - 16.0 g/dL Final   04/09/2020 9.5 (L) 12.0 - 16.0 g/dL Final   03/11/2020 8.7 (L) 12.0 - 16.0 g/dL Final   03/10/2020 10.2 (L) 12.0 - 16.0 g/dL Final   03/06/2020 9.2 (L) 12.0 - 16.0 g/dL Final   03/03/2020 9.6 (L) 12.0 - 16.0 g/dL Final   10/18/2018 12.0 12.0 - 16.0 g/dL Final   05/01/2018 13.3 12.0 - 16.0 g/dL Final     Platelets   Date Value Ref Range Status   08/14/2023 175 140 - 450 10*3/mm3 Final   06/16/2023 152 140 - 450 10*3/mm3 Final   06/02/2023 239 140 - 450 10*3/mm3 Final   04/21/2021 276 140 - 440 10*3/uL Final   11/09/2020 287 140 - 440 10*3/uL Final   05/29/2020 298 140 - 440 10*3/uL Final   04/09/2020 319 140 - 440 10*3/uL Final   03/11/2020 287 140 - 440 10*3/uL Final   03/10/2020 309 140 - 440 10*3/uL Final   03/06/2020 313 140 - 440 10*3/uL Final   03/03/2020 287 140 - 440 10*3/uL Final   10/18/2018 270 140 - 440 10*3/uL Final   05/01/2018 255 140 - 440 10*3/uL Final       Assessment & Plan   Iron deficiency anemia, unspecified iron deficiency anemia type  - Ferritin  - Iron Profile  - Retic With IRF & RET-He  - CBC & Differential          Radha S Elliot   *Iron deficiency anemia  11/28/2022: Ferritin 3, 12% saturation.  Cannot tolerate  oral iron due to constipation  Hb 6.7 on 6/2/2023 (initial consult), from 7.6 on 5/18/2023, from 8.5 on 2/17/2023, from 8.6 on 11/28/2022.  6/2/2023: Transfuse 2 units and arrange 2 doses Injectafer (provided pending iron labs are low)  8/14/2023: Ferritin 165, 29% saturation, Hb 12.5.  No need for Injectafer  11/1/2023: Hb 13.2, mcv 92.5    *Macrocytosis  Was microcytic prior to iron replacement  Unremarkable: B12, serum folate  MCV 92.5 from 97.4    *Source of iron deficiency  Last colonoscopy and EGD by Dr. Petty, 2021.  6/2/2023 (initial consult): She declines referral for small bowel evaluation.  She understands she may have a GI malignancy that is bleeding causing iron deficiency but declines any further evaluation for this    *Leukocytopenia  WBC 2.5 from 3.6, from 2.7 on 5/18/2023, from 3.1 on 2/17/2023, from 3.3 on 11/28/2022.    Plan  MD roughly 3 months.  At least 1 day prior: Ferritin, iron panel, CBC, reticulate hemoglobin  No need for Injectafer now  Await recent onset iron labs from [facility].  I have asked our office to look for these numbers

## 2024-02-13 ENCOUNTER — LAB (OUTPATIENT)
Dept: LAB | Facility: HOSPITAL | Age: 74
End: 2024-02-13
Payer: MEDICARE

## 2024-02-13 DIAGNOSIS — D50.9 IRON DEFICIENCY ANEMIA, UNSPECIFIED IRON DEFICIENCY ANEMIA TYPE: ICD-10-CM

## 2024-02-13 LAB
BASOPHILS # BLD AUTO: 0.01 10*3/MM3 (ref 0–0.2)
BASOPHILS NFR BLD AUTO: 0.3 % (ref 0–1.5)
DEPRECATED RDW RBC AUTO: 51.7 FL (ref 37–54)
EOSINOPHIL # BLD AUTO: 0.06 10*3/MM3 (ref 0–0.4)
EOSINOPHIL NFR BLD AUTO: 2.1 % (ref 0.3–6.2)
ERYTHROCYTE [DISTWIDTH] IN BLOOD BY AUTOMATED COUNT: 14.1 % (ref 12.3–15.4)
FERRITIN SERPL-MCNC: 64.6 NG/ML (ref 13–150)
HCT VFR BLD AUTO: 40.8 % (ref 34–46.6)
HGB BLD-MCNC: 13 G/DL (ref 12–15.9)
HGB RETIC QN AUTO: 32.9 PG (ref 29.8–36.1)
IMM GRANULOCYTES # BLD AUTO: 0.12 10*3/MM3 (ref 0–0.05)
IMM GRANULOCYTES NFR BLD AUTO: 4.1 % (ref 0–0.5)
IMM RETICS NFR: 19.7 % (ref 3–15.8)
IRON 24H UR-MRATE: 75 MCG/DL (ref 37–145)
IRON SATN MFR SERPL: 26 % (ref 20–50)
LYMPHOCYTES # BLD AUTO: 1.64 10*3/MM3 (ref 0.7–3.1)
LYMPHOCYTES NFR BLD AUTO: 56.4 % (ref 19.6–45.3)
MCH RBC QN AUTO: 31.6 PG (ref 26.6–33)
MCHC RBC AUTO-ENTMCNC: 31.9 G/DL (ref 31.5–35.7)
MCV RBC AUTO: 99.3 FL (ref 79–97)
MONOCYTES # BLD AUTO: 0.18 10*3/MM3 (ref 0.1–0.9)
MONOCYTES NFR BLD AUTO: 6.2 % (ref 5–12)
NEUTROPHILS NFR BLD AUTO: 0.9 10*3/MM3 (ref 1.7–7)
NEUTROPHILS NFR BLD AUTO: 30.9 % (ref 42.7–76)
NRBC BLD AUTO-RTO: 0 /100 WBC (ref 0–0.2)
PLATELET # BLD AUTO: 193 10*3/MM3 (ref 140–450)
PMV BLD AUTO: 10.3 FL (ref 6–12)
RBC # BLD AUTO: 4.11 10*6/MM3 (ref 3.77–5.28)
RETICS # AUTO: 0.08 10*6/MM3 (ref 0.02–0.13)
RETICS/RBC NFR AUTO: 1.89 % (ref 0.7–1.9)
TIBC SERPL-MCNC: 293 MCG/DL (ref 298–536)
TRANSFERRIN SERPL-MCNC: 209 MG/DL (ref 200–360)
WBC NRBC COR # BLD AUTO: 2.91 10*3/MM3 (ref 3.4–10.8)

## 2024-02-13 PROCEDURE — 83540 ASSAY OF IRON: CPT

## 2024-02-13 PROCEDURE — 84466 ASSAY OF TRANSFERRIN: CPT

## 2024-02-13 PROCEDURE — 36415 COLL VENOUS BLD VENIPUNCTURE: CPT

## 2024-02-13 PROCEDURE — 85025 COMPLETE CBC W/AUTO DIFF WBC: CPT

## 2024-02-13 PROCEDURE — 85046 RETICYTE/HGB CONCENTRATE: CPT

## 2024-02-13 PROCEDURE — 82728 ASSAY OF FERRITIN: CPT

## 2024-02-14 ENCOUNTER — OFFICE VISIT (OUTPATIENT)
Dept: ONCOLOGY | Facility: CLINIC | Age: 74
End: 2024-02-14
Payer: MEDICARE

## 2024-02-14 VITALS
DIASTOLIC BLOOD PRESSURE: 69 MMHG | WEIGHT: 167.2 LBS | TEMPERATURE: 98.1 F | OXYGEN SATURATION: 98 % | SYSTOLIC BLOOD PRESSURE: 125 MMHG | BODY MASS INDEX: 30.77 KG/M2 | RESPIRATION RATE: 18 BRPM | HEIGHT: 62 IN | HEART RATE: 70 BPM

## 2024-02-14 DIAGNOSIS — D50.9 IRON DEFICIENCY ANEMIA, UNSPECIFIED IRON DEFICIENCY ANEMIA TYPE: Primary | ICD-10-CM

## 2024-02-14 PROCEDURE — 99214 OFFICE O/P EST MOD 30 MIN: CPT | Performed by: INTERNAL MEDICINE

## 2024-02-14 PROCEDURE — 1126F AMNT PAIN NOTED NONE PRSNT: CPT | Performed by: INTERNAL MEDICINE

## 2024-02-14 RX ORDER — ALBUTEROL SULFATE 90 UG/1
2 AEROSOL, METERED RESPIRATORY (INHALATION) EVERY 4 HOURS PRN
COMMUNITY
Start: 2024-01-05 | End: 2025-01-04

## 2024-04-29 ENCOUNTER — LAB (OUTPATIENT)
Dept: LAB | Facility: HOSPITAL | Age: 74
End: 2024-04-29
Payer: MEDICARE

## 2024-04-29 ENCOUNTER — OFFICE VISIT (OUTPATIENT)
Dept: ONCOLOGY | Facility: CLINIC | Age: 74
End: 2024-04-29
Payer: MEDICARE

## 2024-04-29 VITALS
HEIGHT: 62 IN | RESPIRATION RATE: 18 BRPM | OXYGEN SATURATION: 96 % | SYSTOLIC BLOOD PRESSURE: 153 MMHG | BODY MASS INDEX: 30.11 KG/M2 | WEIGHT: 163.6 LBS | TEMPERATURE: 98.4 F | DIASTOLIC BLOOD PRESSURE: 88 MMHG | HEART RATE: 71 BPM

## 2024-04-29 DIAGNOSIS — D50.9 IRON DEFICIENCY ANEMIA, UNSPECIFIED IRON DEFICIENCY ANEMIA TYPE: Primary | ICD-10-CM

## 2024-04-29 DIAGNOSIS — D50.9 IRON DEFICIENCY ANEMIA, UNSPECIFIED IRON DEFICIENCY ANEMIA TYPE: ICD-10-CM

## 2024-04-29 LAB
BASOPHILS # BLD AUTO: 0.02 10*3/MM3 (ref 0–0.2)
BASOPHILS NFR BLD AUTO: 0.6 % (ref 0–1.5)
DEPRECATED RDW RBC AUTO: 47 FL (ref 37–54)
EOSINOPHIL # BLD AUTO: 0.02 10*3/MM3 (ref 0–0.4)
EOSINOPHIL NFR BLD AUTO: 0.6 % (ref 0.3–6.2)
ERYTHROCYTE [DISTWIDTH] IN BLOOD BY AUTOMATED COUNT: 13 % (ref 12.3–15.4)
FERRITIN SERPL-MCNC: 36.2 NG/ML (ref 13–150)
HCT VFR BLD AUTO: 43 % (ref 34–46.6)
HGB BLD-MCNC: 13.6 G/DL (ref 12–15.9)
HGB RETIC QN AUTO: 32.7 PG (ref 29.8–36.1)
IMM GRANULOCYTES # BLD AUTO: 0.02 10*3/MM3 (ref 0–0.05)
IMM GRANULOCYTES NFR BLD AUTO: 0.6 % (ref 0–0.5)
IMM RETICS NFR: 18.8 % (ref 3–15.8)
IRON 24H UR-MRATE: 73 MCG/DL (ref 37–145)
IRON SATN MFR SERPL: 22 % (ref 20–50)
LYMPHOCYTES # BLD AUTO: 1.5 10*3/MM3 (ref 0.7–3.1)
LYMPHOCYTES NFR BLD AUTO: 47.2 % (ref 19.6–45.3)
MCH RBC QN AUTO: 31.3 PG (ref 26.6–33)
MCHC RBC AUTO-ENTMCNC: 31.6 G/DL (ref 31.5–35.7)
MCV RBC AUTO: 98.9 FL (ref 79–97)
MONOCYTES # BLD AUTO: 0.17 10*3/MM3 (ref 0.1–0.9)
MONOCYTES NFR BLD AUTO: 5.3 % (ref 5–12)
NEUTROPHILS NFR BLD AUTO: 1.45 10*3/MM3 (ref 1.7–7)
NEUTROPHILS NFR BLD AUTO: 45.7 % (ref 42.7–76)
NRBC BLD AUTO-RTO: 0 /100 WBC (ref 0–0.2)
PLATELET # BLD AUTO: 188 10*3/MM3 (ref 140–450)
PMV BLD AUTO: 10.5 FL (ref 6–12)
RBC # BLD AUTO: 4.35 10*6/MM3 (ref 3.77–5.28)
RETICS # AUTO: 0.08 10*6/MM3 (ref 0.02–0.13)
RETICS/RBC NFR AUTO: 1.9 % (ref 0.7–1.9)
TIBC SERPL-MCNC: 332 MCG/DL (ref 298–536)
TRANSFERRIN SERPL-MCNC: 223 MG/DL (ref 200–360)
WBC NRBC COR # BLD AUTO: 3.18 10*3/MM3 (ref 3.4–10.8)

## 2024-04-29 PROCEDURE — 84466 ASSAY OF TRANSFERRIN: CPT

## 2024-04-29 PROCEDURE — 36415 COLL VENOUS BLD VENIPUNCTURE: CPT

## 2024-04-29 PROCEDURE — 85025 COMPLETE CBC W/AUTO DIFF WBC: CPT

## 2024-04-29 PROCEDURE — 1125F AMNT PAIN NOTED PAIN PRSNT: CPT | Performed by: INTERNAL MEDICINE

## 2024-04-29 PROCEDURE — 83540 ASSAY OF IRON: CPT

## 2024-04-29 PROCEDURE — 85046 RETICYTE/HGB CONCENTRATE: CPT

## 2024-04-29 PROCEDURE — 82728 ASSAY OF FERRITIN: CPT

## 2024-04-29 PROCEDURE — 99214 OFFICE O/P EST MOD 30 MIN: CPT | Performed by: INTERNAL MEDICINE

## 2024-04-29 RX ORDER — BLOOD SUGAR DIAGNOSTIC
STRIP MISCELLANEOUS
COMMUNITY
Start: 2024-03-11 | End: 2025-03-11

## 2024-04-29 RX ORDER — BLOOD SUGAR DIAGNOSTIC
STRIP MISCELLANEOUS
COMMUNITY
Start: 2024-03-06 | End: 2025-03-06

## 2024-04-29 NOTE — PROGRESS NOTES
.     REASON FOR FOLLOWUP :   Iron deficiency anemia  HISTORY OF PRESENT ILLNESS:  The patient is a 74 y.o. year old female  who is here for follow-up with the above-mentioned history.    No bleeding.  No chest pain or SOA.    Past Medical History:   Diagnosis Date    Arthritis     Cubital tunnel syndrome     Diabetes mellitus     H/O Skin lesion     History of atrial fibrillation     History of Brennan's esophagus     History of iron deficiency anemia     Hypercholesterolemia     Hyperlipidemia      Past Surgical History:   Procedure Laterality Date    HAND SURGERY Right     TOTAL KNEE ARTHROPLASTY Left     WISDOM TOOTH EXTRACTION         MEDICATIONS    Current Outpatient Medications:     albuterol sulfate  (90 Base) MCG/ACT inhaler, Inhale 2 puffs Every 4 (Four) Hours As Needed., Disp: , Rfl:     AMLODIPINE BENZOATE PO, amLODIPine Benzoate, Disp: , Rfl:     apixaban (ELIQUIS) 5 MG tablet tablet, Take 1 tablet by mouth., Disp: , Rfl:     cyanocobalamin (VITAMIN B-12) 500 MCG tablet, Take 1 tablet by mouth Daily., Disp: , Rfl:     ferrous gluconate (FERGON) 324 MG tablet, Take 1 tablet by mouth Daily., Disp: , Rfl:     gabapentin (NEURONTIN) 300 MG capsule, , Disp: , Rfl:     glucose blood (Accu-Chek Bronwyn Plus) test strip, Use to check blood sugars bid, Disp: , Rfl:     glucose blood (Accu-Chek Guide) test strip, Use to check blood sugars bid, Disp: , Rfl:     metFORMIN ER (GLUCOPHAGE-XR) 500 MG 24 hr tablet, Take 1 tablet by mouth., Disp: , Rfl:     metoprolol succinate XL (TOPROL-XL) 50 MG 24 hr tablet, , Disp: , Rfl:     pravastatin (PRAVACHOL) 20 MG tablet, , Disp: , Rfl:     PRAVASTATIN SODIUM PO, Pravastatin Sodium, Disp: , Rfl:     dapagliflozin (FARXIGA) 5 MG tablet tablet, Take 1 tablet by mouth Daily., Disp: 30 tablet, Rfl: 11    ALLERGIES:     Allergies   Allergen Reactions    Ace Inhibitors Unknown - Low Severity     And ARB    Ampicillin Unknown - Low Severity    Methylprednisolone Hives     "Penicillins Hives    Rosuvastatin Unknown - Low Severity       SOCIAL HISTORY:       Social History     Socioeconomic History    Marital status:    Tobacco Use    Smoking status: Never    Smokeless tobacco: Never   Substance and Sexual Activity    Alcohol use: Never         FAMILY HISTORY:  Family History   Problem Relation Age of Onset    Melanoma Mother     Heart attack Father     Cancer Brother     Cancer Maternal Aunt        REVIEW OF SYSTEMS:  Review of Systems   Constitutional:  Negative for activity change.   HENT:  Negative for nosebleeds and trouble swallowing.    Respiratory:  Negative for shortness of breath and wheezing.    Cardiovascular:  Negative for chest pain and palpitations.   Gastrointestinal:  Negative for constipation, diarrhea and nausea.   Genitourinary:  Negative for dysuria and hematuria.   Musculoskeletal:  Negative for arthralgias and myalgias.   Skin:  Negative for rash and wound.   Neurological:  Negative for seizures and syncope.   Hematological:  Negative for adenopathy. Does not bruise/bleed easily.   Psychiatric/Behavioral:  Negative for confusion.               Vitals:    04/29/24 0841   BP: 153/88   Pulse: 71   Resp: 18   Temp: 98.4 °F (36.9 °C)   TempSrc: Temporal   SpO2: 96%   Weight: 74.2 kg (163 lb 9.6 oz)   Height: 157 cm (61.81\")   PainSc:   4   PainLoc: Knee         4/29/2024     8:21 AM   Current Status   ECOG score 0      PHYSICAL EXAM:          CONSTITUTIONAL:  Vital signs reviewed.  No distress, looks comfortable.  EYES:  Conjunctiva and lids unremarkable.  PERRLA  EARS,NOSE,MOUTH,THROAT:  Ears and nose appear unremarkable.  Lips, teeth, gums appear unremarkable.  RESPIRATORY:  Normal respiratory effort.  Lungs clear to auscultation bilaterally.  CARDIOVASCULAR:  Normal S1, S2.  No murmurs rubs or gallops.  No significant lower extremity edema.  GASTROINTESTINAL: Abdomen appears unremarkable.  Nontender.  No hepatomegaly.  No splenomegaly.  LYMPHATIC:  No " cervical, supraclavicular, axillary lymphadenopathy.  SKIN:  Warm.  No rashes.  PSYCHIATRIC:  Normal judgment and insight.  Normal mood and affect.        RECENT LABS:        WBC   Date Value Ref Range Status   04/29/2024 3.18 (L) 3.40 - 10.80 10*3/mm3 Final   02/13/2024 2.91 (L) 3.40 - 10.80 10*3/mm3 Final   08/14/2023 3.58 3.40 - 10.80 10*3/mm3 Final   06/16/2023 6.27 3.40 - 10.80 10*3/mm3 Final   06/02/2023 3.61 3.40 - 10.80 10*3/mm3 Final   04/21/2021 5.65 4.5 - 11.0 10*3/uL Final   12/13/2020 4.8 4.5 - 11.0 10*3/uL Final   11/09/2020 4.11 (L) 4.5 - 11.0 10*3/uL Final   05/29/2020 3.46 (L) 4.5 - 11.0 10*3/uL Final   04/09/2020 3.83 (L) 4.5 - 11.0 10*3/uL Final   03/11/2020 4.54 4.5 - 11.0 10*3/uL Final   03/10/2020 4.72 4.5 - 11.0 10*3/uL Final   03/06/2020 5.08 4.5 - 11.0 10*3/uL Final   03/03/2020 3.83 (L) 4.5 - 11.0 10*3/uL Final   10/18/2018 4.46 (L) 4.5 - 11.0 10*3/uL Final   05/01/2018 5.27 4.5 - 11.0 10*3/uL Final     Hemoglobin   Date Value Ref Range Status   04/29/2024 13.6 12.0 - 15.9 g/dL Final   02/13/2024 13.0 12.0 - 15.9 g/dL Final   08/14/2023 12.5 12.0 - 15.9 g/dL Final   06/16/2023 11.8 (L) 12.0 - 15.9 g/dL Final   06/02/2023 6.7 (C) 12.0 - 15.9 g/dL Final   04/21/2021 9.7 (L) 12.0 - 16.0 g/dL Final   12/13/2020 9.6 (L) 12.0 - 16.0 g/dL Final   11/09/2020 9.6 (L) 12.0 - 16.0 g/dL Final   05/29/2020 11.0 (L) 12.0 - 16.0 g/dL Final   04/09/2020 9.5 (L) 12.0 - 16.0 g/dL Final   03/11/2020 8.7 (L) 12.0 - 16.0 g/dL Final   03/10/2020 10.2 (L) 12.0 - 16.0 g/dL Final   03/06/2020 9.2 (L) 12.0 - 16.0 g/dL Final   03/03/2020 9.6 (L) 12.0 - 16.0 g/dL Final   10/18/2018 12.0 12.0 - 16.0 g/dL Final   05/01/2018 13.3 12.0 - 16.0 g/dL Final     Platelets   Date Value Ref Range Status   04/29/2024 188 140 - 450 10*3/mm3 Final   02/13/2024 193 140 - 450 10*3/mm3 Final   08/14/2023 175 140 - 450 10*3/mm3 Final   06/16/2023 152 140 - 450 10*3/mm3 Final   06/02/2023 239 140 - 450 10*3/mm3 Final   04/21/2021 276  140 - 440 10*3/uL Final   12/13/2020 301 140 - 440 10*3/uL Final   11/09/2020 287 140 - 440 10*3/uL Final   05/29/2020 298 140 - 440 10*3/uL Final   04/09/2020 319 140 - 440 10*3/uL Final   03/11/2020 287 140 - 440 10*3/uL Final   03/10/2020 309 140 - 440 10*3/uL Final   03/06/2020 313 140 - 440 10*3/uL Final   03/03/2020 287 140 - 440 10*3/uL Final   10/18/2018 270 140 - 440 10*3/uL Final   05/01/2018 255 140 - 440 10*3/uL Final       Assessment & Plan   Iron deficiency anemia, unspecified iron deficiency anemia type  - Ferritin  - CBC & Differential  - Iron Profile  - Retic With IRF & RET-He            Radha Zarate   *Iron deficiency anemia  11/28/2022: Ferritin 3, 12% saturation.  Cannot tolerate oral iron due to constipation  Hb 6.7 on 6/2/2023 (initial consult), from 7.6 on 5/18/2023, from 8.5 on 2/17/2023, from 8.6 on 11/28/2022.  6/2/2023: Transfuse 2 units and arrange 2 doses Injectafer (provided pending iron labs are low)  8/14/2023: Ferritin 165, 29% saturation, Hb 12.5.  No need for Injectafer  11/1/2023: Hb 13.2, mcv 92.5  2/13/2024: Ferritin 64.6, 26% saturation, Hb 13.  No need for Injectafer.  4/29/2024: Hb 13.6.  Await iron labs.    *Macrocytosis  Was microcytic prior to iron replacement  Unremarkable: B12, serum folate  MCV 98.9, from 99.3, from 92.5 from 97.4    *Source of iron deficiency  Last colonoscopy and EGD by Dr. Petty, 2021.  6/2/2023 (initial consult): She declines referral for small bowel evaluation.  She understands she may have a GI malignancy that is bleeding causing iron deficiency but declines any further evaluation for this    *Leukocytopenia  WBC 3.2, from 2.9, from 2.5 from 3.6, from 2.7 on 5/18/2023, from 3.1 on 2/17/2023, from 3.3 on 11/28/2022.        Plan  MD 3--4 months. Stat labs same day: Ferritin, iron panel, CBC, reticulate hemoglobin.   She requests stat labs same day knowing we will not to likely have results by the time I see her  Await today's iron labs to see if  she needs Injectafer.

## 2024-08-12 ENCOUNTER — LAB (OUTPATIENT)
Dept: LAB | Facility: HOSPITAL | Age: 74
End: 2024-08-12
Payer: MEDICARE

## 2024-08-12 ENCOUNTER — OFFICE VISIT (OUTPATIENT)
Dept: ONCOLOGY | Facility: CLINIC | Age: 74
End: 2024-08-12
Payer: MEDICARE

## 2024-08-12 VITALS
HEIGHT: 62 IN | RESPIRATION RATE: 18 BRPM | SYSTOLIC BLOOD PRESSURE: 124 MMHG | DIASTOLIC BLOOD PRESSURE: 82 MMHG | BODY MASS INDEX: 29.55 KG/M2 | OXYGEN SATURATION: 97 % | TEMPERATURE: 98.2 F | HEART RATE: 75 BPM | WEIGHT: 160.6 LBS

## 2024-08-12 DIAGNOSIS — D50.9 IRON DEFICIENCY ANEMIA, UNSPECIFIED IRON DEFICIENCY ANEMIA TYPE: ICD-10-CM

## 2024-08-12 DIAGNOSIS — D50.9 IRON DEFICIENCY ANEMIA, UNSPECIFIED IRON DEFICIENCY ANEMIA TYPE: Primary | ICD-10-CM

## 2024-08-12 LAB
BASOPHILS # BLD AUTO: 0.01 10*3/MM3 (ref 0–0.2)
BASOPHILS NFR BLD AUTO: 0.2 % (ref 0–1.5)
DEPRECATED RDW RBC AUTO: 50.6 FL (ref 37–54)
EOSINOPHIL # BLD AUTO: 0.04 10*3/MM3 (ref 0–0.4)
EOSINOPHIL NFR BLD AUTO: 0.8 % (ref 0.3–6.2)
ERYTHROCYTE [DISTWIDTH] IN BLOOD BY AUTOMATED COUNT: 14.5 % (ref 12.3–15.4)
FERRITIN SERPL-MCNC: 13.8 NG/ML (ref 13–150)
HCT VFR BLD AUTO: 40.7 % (ref 34–46.6)
HGB BLD-MCNC: 12.5 G/DL (ref 12–15.9)
HGB RETIC QN AUTO: 32.4 PG (ref 29.8–36.1)
IMM GRANULOCYTES # BLD AUTO: 0.08 10*3/MM3 (ref 0–0.05)
IMM GRANULOCYTES NFR BLD AUTO: 1.7 % (ref 0–0.5)
IMM RETICS NFR: 25.6 % (ref 3–15.8)
IRON 24H UR-MRATE: 53 MCG/DL (ref 37–145)
IRON SATN MFR SERPL: 14 % (ref 20–50)
LYMPHOCYTES # BLD AUTO: 2.17 10*3/MM3 (ref 0.7–3.1)
LYMPHOCYTES NFR BLD AUTO: 45.7 % (ref 19.6–45.3)
MCH RBC QN AUTO: 29.8 PG (ref 26.6–33)
MCHC RBC AUTO-ENTMCNC: 30.7 G/DL (ref 31.5–35.7)
MCV RBC AUTO: 96.9 FL (ref 79–97)
MONOCYTES # BLD AUTO: 0.26 10*3/MM3 (ref 0.1–0.9)
MONOCYTES NFR BLD AUTO: 5.5 % (ref 5–12)
NEUTROPHILS NFR BLD AUTO: 2.19 10*3/MM3 (ref 1.7–7)
NEUTROPHILS NFR BLD AUTO: 46.1 % (ref 42.7–76)
NRBC BLD AUTO-RTO: 0 /100 WBC (ref 0–0.2)
PLATELET # BLD AUTO: 190 10*3/MM3 (ref 140–450)
PMV BLD AUTO: 10.5 FL (ref 6–12)
RBC # BLD AUTO: 4.2 10*6/MM3 (ref 3.77–5.28)
RETICS # AUTO: 0.09 10*6/MM3 (ref 0.02–0.13)
RETICS/RBC NFR AUTO: 2.06 % (ref 0.7–1.9)
TIBC SERPL-MCNC: 371 MCG/DL (ref 298–536)
TRANSFERRIN SERPL-MCNC: 249 MG/DL (ref 200–360)
WBC NRBC COR # BLD AUTO: 4.75 10*3/MM3 (ref 3.4–10.8)

## 2024-08-12 PROCEDURE — 83540 ASSAY OF IRON: CPT

## 2024-08-12 PROCEDURE — 1126F AMNT PAIN NOTED NONE PRSNT: CPT | Performed by: INTERNAL MEDICINE

## 2024-08-12 PROCEDURE — 85025 COMPLETE CBC W/AUTO DIFF WBC: CPT

## 2024-08-12 PROCEDURE — 36415 COLL VENOUS BLD VENIPUNCTURE: CPT

## 2024-08-12 PROCEDURE — 82728 ASSAY OF FERRITIN: CPT

## 2024-08-12 PROCEDURE — 84466 ASSAY OF TRANSFERRIN: CPT

## 2024-08-12 PROCEDURE — 99214 OFFICE O/P EST MOD 30 MIN: CPT | Performed by: INTERNAL MEDICINE

## 2024-08-12 PROCEDURE — G2211 COMPLEX E/M VISIT ADD ON: HCPCS | Performed by: INTERNAL MEDICINE

## 2024-08-12 PROCEDURE — 85046 RETICYTE/HGB CONCENTRATE: CPT

## 2024-08-12 RX ORDER — DIPHENOXYLATE HYDROCHLORIDE AND ATROPINE SULFATE 2.5; .025 MG/1; MG/1
TABLET ORAL DAILY
COMMUNITY

## 2024-08-12 NOTE — PROGRESS NOTES
.     REASON FOR FOLLOWUP :   Iron deficiency anemia  HISTORY OF PRESENT ILLNESS:  The patient is a 74 y.o. year old female  who is here for follow-up with the above-mentioned history.    No new problems.  No bleeding.  No chest pain or SOA    Past Medical History:   Diagnosis Date    Arthritis     Cubital tunnel syndrome     Diabetes mellitus     H/O Skin lesion     History of atrial fibrillation     History of Brennan's esophagus     History of iron deficiency anemia     Hypercholesterolemia     Hyperlipidemia      Past Surgical History:   Procedure Laterality Date    HAND SURGERY Right     TOTAL KNEE ARTHROPLASTY Left     WISDOM TOOTH EXTRACTION         MEDICATIONS    Current Outpatient Medications:     AMLODIPINE BENZOATE PO, amLODIPine Benzoate, Disp: , Rfl:     apixaban (ELIQUIS) 5 MG tablet tablet, Take 1 tablet by mouth., Disp: , Rfl:     cyanocobalamin (VITAMIN B-12) 500 MCG tablet, Take 1 tablet by mouth Daily., Disp: , Rfl:     ferrous gluconate (FERGON) 324 MG tablet, Take 1 tablet by mouth Daily., Disp: , Rfl:     gabapentin (NEURONTIN) 300 MG capsule, , Disp: , Rfl:     glucose blood (Accu-Chek Guide) test strip, Use to check blood sugars bid, Disp: , Rfl:     metFORMIN ER (GLUCOPHAGE-XR) 500 MG 24 hr tablet, Take 1 tablet by mouth., Disp: , Rfl:     metoprolol succinate XL (TOPROL-XL) 50 MG 24 hr tablet, , Disp: , Rfl:     multivitamin (MULTIPLE VITAMIN PO), Take  by mouth Daily., Disp: , Rfl:     pravastatin (PRAVACHOL) 20 MG tablet, , Disp: , Rfl:     albuterol sulfate  (90 Base) MCG/ACT inhaler, Inhale 2 puffs Every 4 (Four) Hours As Needed., Disp: , Rfl:     dapagliflozin (FARXIGA) 5 MG tablet tablet, Take 1 tablet by mouth Daily., Disp: 30 tablet, Rfl: 11    glucose blood (Accu-Chek Bronwyn Plus) test strip, Use to check blood sugars bid (Patient not taking: Reported on 8/12/2024), Disp: , Rfl:     PRAVASTATIN SODIUM PO, Pravastatin Sodium (Patient not taking: Reported on 8/12/2024),  "Disp: , Rfl:     ALLERGIES:     Allergies   Allergen Reactions    Ace Inhibitors Unknown - Low Severity     And ARB    Ampicillin Unknown - Low Severity    Methylprednisolone Hives    Penicillins Hives    Rosuvastatin Unknown - Low Severity       SOCIAL HISTORY:       Social History     Socioeconomic History    Marital status:    Tobacco Use    Smoking status: Never    Smokeless tobacco: Never   Substance and Sexual Activity    Alcohol use: Never         FAMILY HISTORY:  Family History   Problem Relation Age of Onset    Melanoma Mother     Heart attack Father     Cancer Brother     Cancer Maternal Aunt        REVIEW OF SYSTEMS:  Review of Systems   Constitutional:  Negative for activity change.   HENT:  Negative for nosebleeds and trouble swallowing.    Respiratory:  Negative for shortness of breath and wheezing.    Cardiovascular:  Negative for chest pain and palpitations.   Gastrointestinal:  Negative for constipation, diarrhea and nausea.   Genitourinary:  Negative for dysuria and hematuria.   Musculoskeletal:  Negative for arthralgias and myalgias.   Skin:  Negative for rash and wound.   Neurological:  Negative for seizures and syncope.   Hematological:  Negative for adenopathy. Does not bruise/bleed easily.   Psychiatric/Behavioral:  Negative for confusion.               Vitals:    08/12/24 1014   BP: 124/82   Pulse: 75   Resp: 18   Temp: 98.2 °F (36.8 °C)   TempSrc: Oral   SpO2: 97%   Weight: 72.8 kg (160 lb 9.6 oz)   Height: 157 cm (61.81\")   PainSc: 0-No pain           8/12/2024    10:14 AM   Current Status   ECOG score 0      PHYSICAL EXAM:        CONSTITUTIONAL:  Vital signs reviewed.  No distress, looks comfortable.  EYES:  Conjunctiva and lids unremarkable.  PERRLA  EARS,NOSE,MOUTH,THROAT:  Ears and nose appear unremarkable.  Lips, teeth, gums appear unremarkable.  RESPIRATORY:  Normal respiratory effort.  Lungs clear to auscultation bilaterally.  CARDIOVASCULAR:  Normal S1, S2.  No murmurs rubs " or gallops.  No significant lower extremity edema.  GASTROINTESTINAL: Abdomen appears unremarkable.  Nontender.  No hepatomegaly.  No splenomegaly.  LYMPHATIC:  No cervical, supraclavicular, axillary lymphadenopathy.  SKIN:  Warm.  No rashes.  PSYCHIATRIC:  Normal judgment and insight.  Normal mood and affect.          RECENT LABS:        WBC   Date Value Ref Range Status   08/12/2024 4.75 3.40 - 10.80 10*3/mm3 Final   04/29/2024 3.18 (L) 3.40 - 10.80 10*3/mm3 Final   02/13/2024 2.91 (L) 3.40 - 10.80 10*3/mm3 Final   08/14/2023 3.58 3.40 - 10.80 10*3/mm3 Final   06/16/2023 6.27 3.40 - 10.80 10*3/mm3 Final   06/02/2023 3.61 3.40 - 10.80 10*3/mm3 Final   04/21/2021 5.65 4.5 - 11.0 10*3/uL Final   12/13/2020 4.8 4.5 - 11.0 10*3/uL Final   11/09/2020 4.11 (L) 4.5 - 11.0 10*3/uL Final   05/29/2020 3.46 (L) 4.5 - 11.0 10*3/uL Final   04/09/2020 3.83 (L) 4.5 - 11.0 10*3/uL Final   03/11/2020 4.54 4.5 - 11.0 10*3/uL Final   03/10/2020 4.72 4.5 - 11.0 10*3/uL Final   03/06/2020 5.08 4.5 - 11.0 10*3/uL Final   03/03/2020 3.83 (L) 4.5 - 11.0 10*3/uL Final   10/18/2018 4.46 (L) 4.5 - 11.0 10*3/uL Final   05/01/2018 5.27 4.5 - 11.0 10*3/uL Final     Hemoglobin   Date Value Ref Range Status   08/12/2024 12.5 12.0 - 15.9 g/dL Final   04/29/2024 13.6 12.0 - 15.9 g/dL Final   02/13/2024 13.0 12.0 - 15.9 g/dL Final   08/14/2023 12.5 12.0 - 15.9 g/dL Final   06/16/2023 11.8 (L) 12.0 - 15.9 g/dL Final   06/02/2023 6.7 (C) 12.0 - 15.9 g/dL Final   04/21/2021 9.7 (L) 12.0 - 16.0 g/dL Final   12/13/2020 9.6 (L) 12.0 - 16.0 g/dL Final   11/09/2020 9.6 (L) 12.0 - 16.0 g/dL Final   05/29/2020 11.0 (L) 12.0 - 16.0 g/dL Final   04/09/2020 9.5 (L) 12.0 - 16.0 g/dL Final   03/11/2020 8.7 (L) 12.0 - 16.0 g/dL Final   03/10/2020 10.2 (L) 12.0 - 16.0 g/dL Final   03/06/2020 9.2 (L) 12.0 - 16.0 g/dL Final   03/03/2020 9.6 (L) 12.0 - 16.0 g/dL Final   10/18/2018 12.0 12.0 - 16.0 g/dL Final   05/01/2018 13.3 12.0 - 16.0 g/dL Final     Platelets   Date  Value Ref Range Status   08/12/2024 190 140 - 450 10*3/mm3 Final   04/29/2024 188 140 - 450 10*3/mm3 Final   02/13/2024 193 140 - 450 10*3/mm3 Final   08/14/2023 175 140 - 450 10*3/mm3 Final   06/16/2023 152 140 - 450 10*3/mm3 Final   06/02/2023 239 140 - 450 10*3/mm3 Final   04/21/2021 276 140 - 440 10*3/uL Final   12/13/2020 301 140 - 440 10*3/uL Final   11/09/2020 287 140 - 440 10*3/uL Final   05/29/2020 298 140 - 440 10*3/uL Final   04/09/2020 319 140 - 440 10*3/uL Final   03/11/2020 287 140 - 440 10*3/uL Final   03/10/2020 309 140 - 440 10*3/uL Final   03/06/2020 313 140 - 440 10*3/uL Final   03/03/2020 287 140 - 440 10*3/uL Final   10/18/2018 270 140 - 440 10*3/uL Final   05/01/2018 255 140 - 440 10*3/uL Final       Assessment & Plan   There are no diagnoses linked to this encounter.            Radha Zarate   *Iron deficiency anemia  11/28/2022: Ferritin 3, 12% saturation.  Cannot tolerate oral iron due to constipation  Hb 6.7 on 6/2/2023 (initial consult), from 7.6 on 5/18/2023, from 8.5 on 2/17/2023, from 8.6 on 11/28/2022.  6/2/2023: Transfuse 2 units and arrange 2 doses Injectafer (provided pending iron labs are low)  8/14/2023: Ferritin 165, 29% saturation, Hb 12.5.  No need for Injectafer  11/1/2023: Hb 13.2, mcv 92.5  2/13/2024: Ferritin 64.6, 26% saturation, Hb 13.  No need for Injectafer.  4/29/2024: Hb 13.6.  Await iron labs.  8/12/2024: Ferritin 13.8, 14% saturation, Hb 12.5    *Macrocytosis  Was microcytic prior to iron replacement  Unremarkable: B12, serum folate  MCV 96.9, from 98.9, from 99.3, from 92.5 from 97.4    *Source of iron deficiency  Last colonoscopy and EGD by Dr. Petty, 2021.  6/2/2023 (initial consult): She declines referral for small bowel evaluation.  She understands she may have a GI malignancy that is bleeding causing iron deficiency but declines any further evaluation for this    *Leukocytopenia  WBC 4.8, from 3.2, from 2.9, from 2.5 from 3.6, from 2.7 on 5/18/2023, from 3.1  on 2/17/2023, from 3.3 on 11/28/2022.    Plan  MD 3--4 months. Stat labs same day: Ferritin, iron panel, CBC, reticulate hemoglobin.   She requests stat labs same day knowing we will not to likely have results by the time I see her  Injectafer x 2 doses

## 2024-08-16 ENCOUNTER — TELEPHONE (OUTPATIENT)
Dept: ONCOLOGY | Facility: CLINIC | Age: 74
End: 2024-08-16
Payer: MEDICARE

## 2024-08-16 NOTE — TELEPHONE ENCOUNTER
----- Message from Swapna TORREZ sent at 8/16/2024  1:39 PM EDT -----  Hello,     Patient needs to be pushed out for Injectafer d/t insurance approval still pending     Thank you

## 2024-08-22 ENCOUNTER — INFUSION (OUTPATIENT)
Dept: ONCOLOGY | Facility: HOSPITAL | Age: 74
End: 2024-08-22
Payer: MEDICARE

## 2024-08-22 VITALS
WEIGHT: 161.6 LBS | HEART RATE: 75 BPM | TEMPERATURE: 97.5 F | RESPIRATION RATE: 16 BRPM | DIASTOLIC BLOOD PRESSURE: 92 MMHG | SYSTOLIC BLOOD PRESSURE: 143 MMHG | OXYGEN SATURATION: 98 % | BODY MASS INDEX: 29.74 KG/M2

## 2024-08-22 DIAGNOSIS — T45.4X5A ADVERSE EFFECT OF IRON, INITIAL ENCOUNTER: ICD-10-CM

## 2024-08-22 DIAGNOSIS — D50.9 IRON DEFICIENCY ANEMIA, UNSPECIFIED IRON DEFICIENCY ANEMIA TYPE: Primary | ICD-10-CM

## 2024-08-22 PROCEDURE — 25810000003 SODIUM CHLORIDE 0.9 % SOLUTION: Performed by: INTERNAL MEDICINE

## 2024-08-22 PROCEDURE — A9270 NON-COVERED ITEM OR SERVICE: HCPCS | Performed by: INTERNAL MEDICINE

## 2024-08-22 PROCEDURE — 63710000001 CETIRIZINE 10 MG TABLET: Performed by: INTERNAL MEDICINE

## 2024-08-22 PROCEDURE — 25010000002 FERUMOXYTOL 510 MG/17ML SOLUTION 17 ML VIAL: Performed by: INTERNAL MEDICINE

## 2024-08-22 PROCEDURE — 96374 THER/PROPH/DIAG INJ IV PUSH: CPT

## 2024-08-22 PROCEDURE — 63710000001 ACETAMINOPHEN 325 MG TABLET: Performed by: INTERNAL MEDICINE

## 2024-08-22 RX ORDER — SODIUM CHLORIDE 9 MG/ML
20 INJECTION, SOLUTION INTRAVENOUS ONCE
Status: COMPLETED | OUTPATIENT
Start: 2024-08-22 | End: 2024-08-22

## 2024-08-22 RX ORDER — CETIRIZINE HYDROCHLORIDE 10 MG/1
10 TABLET ORAL ONCE
Status: COMPLETED | OUTPATIENT
Start: 2024-08-22 | End: 2024-08-22

## 2024-08-22 RX ORDER — PROCHLORPERAZINE MALEATE 10 MG
10 TABLET ORAL ONCE
Status: CANCELLED | OUTPATIENT
Start: 2024-08-22 | End: 2024-08-22

## 2024-08-22 RX ORDER — SODIUM CHLORIDE 9 MG/ML
20 INJECTION, SOLUTION INTRAVENOUS ONCE
Status: CANCELLED | OUTPATIENT
Start: 2024-08-22

## 2024-08-22 RX ORDER — ACETAMINOPHEN 325 MG/1
650 TABLET ORAL ONCE
Status: COMPLETED | OUTPATIENT
Start: 2024-08-22 | End: 2024-08-22

## 2024-08-22 RX ADMIN — CETIRIZINE HYDROCHLORIDE 10 MG: 10 TABLET, FILM COATED ORAL at 11:57

## 2024-08-22 RX ADMIN — SODIUM CHLORIDE 20 ML/HR: 9 INJECTION, SOLUTION INTRAVENOUS at 11:57

## 2024-08-22 RX ADMIN — ACETAMINOPHEN 650 MG: 325 TABLET ORAL at 11:57

## 2024-08-22 RX ADMIN — FERUMOXYTOL 510 MG: 510 INJECTION INTRAVENOUS at 12:29

## 2024-08-22 NOTE — NURSING NOTE
Pt here for Injectafer.  When communicating with pre-cert department, pt was denied Injectafer.  Careplan changed and Kelsea Wade RN communicated with pre-cert.  Per Sarah Wheeler in pre-cert, OK to proceed with tx using Fereheme.

## 2024-08-30 ENCOUNTER — INFUSION (OUTPATIENT)
Dept: ONCOLOGY | Facility: HOSPITAL | Age: 74
End: 2024-08-30
Payer: MEDICARE

## 2024-08-30 VITALS
BODY MASS INDEX: 29.22 KG/M2 | HEART RATE: 83 BPM | DIASTOLIC BLOOD PRESSURE: 91 MMHG | SYSTOLIC BLOOD PRESSURE: 147 MMHG | WEIGHT: 158.8 LBS | OXYGEN SATURATION: 95 % | TEMPERATURE: 97.5 F | RESPIRATION RATE: 16 BRPM

## 2024-08-30 DIAGNOSIS — D50.9 IRON DEFICIENCY ANEMIA, UNSPECIFIED IRON DEFICIENCY ANEMIA TYPE: Primary | ICD-10-CM

## 2024-08-30 DIAGNOSIS — T45.4X5A ADVERSE EFFECT OF IRON, INITIAL ENCOUNTER: ICD-10-CM

## 2024-08-30 PROCEDURE — 25810000003 SODIUM CHLORIDE 0.9 % SOLUTION: Performed by: INTERNAL MEDICINE

## 2024-08-30 PROCEDURE — A9270 NON-COVERED ITEM OR SERVICE: HCPCS | Performed by: INTERNAL MEDICINE

## 2024-08-30 PROCEDURE — 63710000001 CETIRIZINE 10 MG TABLET: Performed by: INTERNAL MEDICINE

## 2024-08-30 PROCEDURE — 96374 THER/PROPH/DIAG INJ IV PUSH: CPT

## 2024-08-30 PROCEDURE — 63710000001 ACETAMINOPHEN 325 MG TABLET: Performed by: INTERNAL MEDICINE

## 2024-08-30 PROCEDURE — 25010000002 FERUMOXYTOL 510 MG/17ML SOLUTION 17 ML VIAL: Performed by: INTERNAL MEDICINE

## 2024-08-30 RX ORDER — CETIRIZINE HYDROCHLORIDE 10 MG/1
10 TABLET ORAL ONCE
Status: COMPLETED | OUTPATIENT
Start: 2024-08-30 | End: 2024-08-30

## 2024-08-30 RX ORDER — ACETAMINOPHEN 325 MG/1
650 TABLET ORAL ONCE
Status: COMPLETED | OUTPATIENT
Start: 2024-08-30 | End: 2024-08-30

## 2024-08-30 RX ORDER — SODIUM CHLORIDE 9 MG/ML
20 INJECTION, SOLUTION INTRAVENOUS ONCE
Status: COMPLETED | OUTPATIENT
Start: 2024-08-30 | End: 2024-08-30

## 2024-08-30 RX ADMIN — CETIRIZINE HYDROCHLORIDE 10 MG: 10 TABLET, FILM COATED ORAL at 09:06

## 2024-08-30 RX ADMIN — ACETAMINOPHEN 650 MG: 325 TABLET ORAL at 09:06

## 2024-08-30 RX ADMIN — FERUMOXYTOL 510 MG: 510 INJECTION INTRAVENOUS at 09:34

## 2024-08-30 RX ADMIN — SODIUM CHLORIDE 20 ML/HR: 9 INJECTION, SOLUTION INTRAVENOUS at 09:06

## 2024-10-07 ENCOUNTER — TELEPHONE (OUTPATIENT)
Dept: ONCOLOGY | Facility: CLINIC | Age: 74
End: 2024-10-07
Payer: MEDICARE

## 2024-10-07 NOTE — TELEPHONE ENCOUNTER
Caller: Radha Zarate    Relationship to patient: Self    Best call back number: [phone]    Chief complaint: PATIENT CALLED TO RESCHEDULE     Type of visit: LAB AND FOLLOW UP 1    Requested date: DECEMBER 10TH OR 13TH  OR 16TH THROUGH THE 20TH, EARLY MORNINGS PREFERRED       If rescheduling, when is the original appointment: 12-9-24

## 2024-10-23 ENCOUNTER — TELEPHONE (OUTPATIENT)
Dept: ONCOLOGY | Facility: CLINIC | Age: 74
End: 2024-10-23

## 2024-10-23 NOTE — TELEPHONE ENCOUNTER
Caller: Radha Zarate    Relationship to patient: Self    Best call back number: [phone]     Chief complaint: PATIENT TO CANCEL 12/16/24 APPT AND WILL CALL BACK TO RESCHEDULE